# Patient Record
Sex: MALE | Race: WHITE | NOT HISPANIC OR LATINO | Employment: FULL TIME | URBAN - NONMETROPOLITAN AREA
[De-identification: names, ages, dates, MRNs, and addresses within clinical notes are randomized per-mention and may not be internally consistent; named-entity substitution may affect disease eponyms.]

---

## 2022-11-04 ENCOUNTER — APPOINTMENT (OUTPATIENT)
Dept: GENERAL RADIOLOGY | Facility: OTHER | Age: 62
End: 2022-11-04
Attending: PHYSICIAN ASSISTANT

## 2022-11-04 ENCOUNTER — APPOINTMENT (OUTPATIENT)
Dept: CT IMAGING | Facility: OTHER | Age: 62
End: 2022-11-04
Attending: PHYSICIAN ASSISTANT

## 2022-11-04 ENCOUNTER — HOSPITAL ENCOUNTER (EMERGENCY)
Facility: OTHER | Age: 62
Discharge: HOME | End: 2022-11-04
Attending: PHYSICIAN ASSISTANT | Admitting: PHYSICIAN ASSISTANT

## 2022-11-04 VITALS
HEIGHT: 69 IN | OXYGEN SATURATION: 96 % | HEART RATE: 97 BPM | WEIGHT: 245 LBS | RESPIRATION RATE: 14 BRPM | BODY MASS INDEX: 36.29 KG/M2 | SYSTOLIC BLOOD PRESSURE: 128 MMHG | TEMPERATURE: 98.2 F | DIASTOLIC BLOOD PRESSURE: 102 MMHG
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
## 2022-11-04 DIAGNOSIS — M25.512 LEFT SHOULDER PAIN: ICD-10-CM

## 2022-11-04 DIAGNOSIS — M54.2 NECK PAIN: ICD-10-CM

## 2022-11-04 DIAGNOSIS — T14.90XA TRAUMA: ICD-10-CM

## 2022-11-04 DIAGNOSIS — S00.91XA ABRASION OF HEAD: ICD-10-CM

## 2022-11-04 PROCEDURE — 99285 EMERGENCY DEPT VISIT HI MDM: CPT | Mod: 25 | Performed by: PHYSICIAN ASSISTANT

## 2022-11-04 PROCEDURE — 683N000002 HC PARTIAL TRAUMA W/O CC LEVEL III: Performed by: PHYSICIAN ASSISTANT

## 2022-11-04 PROCEDURE — 71046 X-RAY EXAM CHEST 2 VIEWS: CPT

## 2022-11-04 PROCEDURE — 70450 CT HEAD/BRAIN W/O DYE: CPT

## 2022-11-04 PROCEDURE — 99283 EMERGENCY DEPT VISIT LOW MDM: CPT | Performed by: PHYSICIAN ASSISTANT

## 2022-11-04 PROCEDURE — 72125 CT NECK SPINE W/O DYE: CPT

## 2022-11-04 RX ORDER — TADALAFIL 20 MG/1
20 TABLET ORAL
COMMUNITY
Start: 2020-12-08

## 2022-11-04 RX ORDER — IBUPROFEN 600 MG/1
TABLET, FILM COATED ORAL
COMMUNITY

## 2022-11-04 RX ORDER — MULTIVITAMIN,THERAPEUTIC
2 TABLET ORAL
COMMUNITY

## 2022-11-04 ASSESSMENT — ENCOUNTER SYMPTOMS
DYSURIA: 0
ABDOMINAL PAIN: 0
SHORTNESS OF BREATH: 0
VOMITING: 0
NAUSEA: 0
CONFUSION: 0
NECK PAIN: 1
FEVER: 0

## 2022-11-04 ASSESSMENT — ACTIVITIES OF DAILY LIVING (ADL): ADLS_ACUITY_SCORE: 35

## 2022-11-04 NOTE — DISCHARGE INSTRUCTIONS
Get plenty of fluids and rest.  As discussed, all of your vital signs, physical exam and imaging appear well today.  No obvious injury seen in your left shoulder area however we did discuss obtaining dedicated pictures to the left shoulder was side to hold off on those for now.  You can take Tylenol, use ice and heat.  I expect you to be more sore tomorrow than you are today.  Return if there are worsening or concerning symptoms otherwise follow-up with PCP as needed.
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
## 2022-11-04 NOTE — ED NOTES
Patient ambulated in hallway without any issue.  No changes in cognition or neurologic status.  Sore to his neck, head, and shoulder.  Heat applied for comfort.
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
